# Patient Record
Sex: MALE | Race: WHITE | Employment: FULL TIME | URBAN - NONMETROPOLITAN AREA
[De-identification: names, ages, dates, MRNs, and addresses within clinical notes are randomized per-mention and may not be internally consistent; named-entity substitution may affect disease eponyms.]

---

## 2018-01-30 ENCOUNTER — OFFICE VISIT (OUTPATIENT)
Dept: URGENT CARE | Facility: PHYSICIAN GROUP | Age: 38
End: 2018-01-30
Payer: COMMERCIAL

## 2018-01-30 VITALS
HEART RATE: 102 BPM | TEMPERATURE: 98 F | RESPIRATION RATE: 16 BRPM | OXYGEN SATURATION: 99 % | BODY MASS INDEX: 28.14 KG/M2 | SYSTOLIC BLOOD PRESSURE: 162 MMHG | DIASTOLIC BLOOD PRESSURE: 102 MMHG | WEIGHT: 190 LBS | HEIGHT: 69 IN

## 2018-01-30 DIAGNOSIS — R06.02 SOB (SHORTNESS OF BREATH): ICD-10-CM

## 2018-01-30 DIAGNOSIS — R07.89 CHEST HEAVINESS: ICD-10-CM

## 2018-01-30 DIAGNOSIS — R20.0 NUMBNESS AND TINGLING OF UPPER AND LOWER EXTREMITIES OF BOTH SIDES: ICD-10-CM

## 2018-01-30 DIAGNOSIS — R61 DIAPHORESIS: ICD-10-CM

## 2018-01-30 DIAGNOSIS — R11.0 NAUSEA: ICD-10-CM

## 2018-01-30 DIAGNOSIS — Z56.6 STRESS AT WORK: ICD-10-CM

## 2018-01-30 DIAGNOSIS — R20.2 NUMBNESS AND TINGLING OF UPPER AND LOWER EXTREMITIES OF BOTH SIDES: ICD-10-CM

## 2018-01-30 LAB — GLUCOSE BLD-MCNC: 116 MG/DL (ref 70–100)

## 2018-01-30 PROCEDURE — 99214 OFFICE O/P EST MOD 30 MIN: CPT | Performed by: PHYSICIAN ASSISTANT

## 2018-01-30 PROCEDURE — 82962 GLUCOSE BLOOD TEST: CPT | Performed by: PHYSICIAN ASSISTANT

## 2018-01-30 RX ORDER — ASPIRIN 81 MG/1
324 TABLET, CHEWABLE ORAL ONCE
OUTPATIENT
Start: 2018-01-30 | End: 2018-01-31

## 2018-01-30 SDOH — HEALTH STABILITY - MENTAL HEALTH: OTHER PHYSICAL AND MENTAL STRAIN RELATED TO WORK: Z56.6

## 2018-01-31 NOTE — PROGRESS NOTES
"Chief Complaint   Patient presents with   • Numbness     extremities all of them driving home a couple min    • Shortness of Breath       HISTORY OF PRESENT ILLNESS: Patient is a 37 y.o. male who presents today for the following:    Driving home from work around 1700 today  Numbness of arms/legs/back of head suddenly  Pulled over, got a candy bar/gatorade: numbness resolved, still felt \"weird\"   Started driving again; 20 minutes after driving, numbness started again in the same locations  H/o the same but not numb everywhere at once; occurs while working or under stress  \"Pretty stressful day at work\" today  + SOB, heaviness in chest, cold/clammy, nauseated (in waiting room only)  Denies leg swelling  No known PMH (hasn't seen a doctor in 15 years)  Family hx: Dad, DM/MI,  at 50 ?DM; Mom, cancer; siblings, no MI/CVA/HTN  EtOH: 2 drinks/week  Tobacco: none  Illicit: denies any hx  CURRENTLY: heavy chest, SOB, clammy     Patient Active Problem List    Diagnosis Date Noted   • Strep pharyngitis 2016   • Pharyngitis 2016       Allergies:Patient has no known allergies.    No current [facility]-ordered outpatient prescriptions on file.     Current Facility-Administered Medications Ordered in Epic   Medication Dose Route Frequency Provider Last Rate Last Dose   • aspirin (ASA) chewable tab 324 mg  324 mg Oral Once Guera Caicedo P.A.-SHAGGY.           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 2 yo       Social History   Substance Use Topics   • Smoking status: Never Smoker   • Smokeless tobacco: Never Used   • Alcohol use Yes      Comment: occassional       Family Status   Relation Status   • Mother Alive   • Father    • Sister Alive   • Brother Alive   • Brother Alive   • Sister Alive     Family History   Problem Relation Age of Onset   • Cancer Mother    • Diabetes Father    • Alcohol/Drug Father    • Stroke Father    • Other Brother      liver   • " "Alcohol/Drug Brother    • Heart Disease Neg Hx        Review of Systems:   Constitutional ROS: No unexplained fevers, sweats, or chills  Eye ROS: No recent significant change in vision, No eye pain, redness, discharge  Ear ROS: No drainage, No tinnitus or vertigo, No recent change in hearing  Mouth/Throat ROS: No teeth or gum problems, No bleeding gums, No tongue complaints  Neck ROS: No swollen glands, No recent swelling in thyroid area, No significant pain in neck  Pulmonary ROS: Positive for dyspnea at rest   Cardiovascular ROS: Positive for chest heaviness, elevated blood pressure  Gastrointestinal ROS: Positive for nausea  Musculoskeletal/Extremities ROS: No clubbing, No cyanosis, No peripheral edema  Hematologic/Lymphatic ROS: No abnormal bleeding, No chills, No bruising  Skin/Integumentary ROS: No abnormal skin lesions noted, No edema, No evidence of rash  Neurologic ROS: No seizures, No weakness  Psychiatric ROS: No depression, No psychosis      Exam:  Blood pressure (!) 162/102, pulse (!) 102, temperature 36.7 °C (98 °F), resp. rate 16, height 1.753 m (5' 9\"), weight 86.2 kg (190 lb), SpO2 99 %.  General: Well developed, well nourished. No distress.  HEENT: Conjunctiva clear, lids without ptosis, PERRL/EOMI. Ears normal shape and contour, canals are clear bilaterally, tympanic membranes are benign. Nasal mucosa benign. Oropharynx is without erythema, edema or exudates. Moist mucous membranes.  Neck: Trachea midline, no masses. No thyromegaly. No carotid bruits noted.  Pulmonary: Clear to ausculation and percussion.  Normal effort. No rales, ronchi, or wheezing.   Cardiovascular: Regular rate and rhythm without murmur. No edema.  Radial and pedal pulses are strong and equal bilaterally.  Abdomen: Soft, non-tender, nondistended. No hepatosplenomegaly.   Neurologic: Grossly nonfocal.  Lymph: No cervical lymphadenopathy noted.  Skin: Warm, dry, good turgor. No rashes in visible areas.   Psych: Normal mood. " Alert and oriented x3. Judgment and insight is normal.    EKG, per my interpretation: Normal sinus rhythm, normal axis, no ST elevation/depression.    Glucose: 116    Aspirin 324 mg, chewable    1935, recheck:  Left upper extremity 152/95  Right upper extremity 149/98  Patient states this chest heaviness and shortness of breath have almost completely resolved    Assessment/Plan:  Discussed differential diagnosis with the patient including but not limited to stress, PE, and MI. Unable to rule out any cardiac etiology from this setting. Patient declined ER evaluation and would like to see how he feels at home. Discussed strict ER precautions for any worsening chest heaviness or shortness of breath.  1. Chest heaviness  EKG    aspirin (ASA) chewable tab 324 mg   2. SOB (shortness of breath)  EKG    aspirin (ASA) chewable tab 324 mg   3. Diaphoresis  POCT Glucose    aspirin (ASA) chewable tab 324 mg   4. Nausea  POCT Glucose    aspirin (ASA) chewable tab 324 mg   5. Numbness and tingling of upper and lower extremities of both sides     6. Stress at work

## 2019-04-08 ENCOUNTER — HOSPITAL ENCOUNTER (EMERGENCY)
Dept: HOSPITAL 8 - ED | Age: 39
Discharge: HOME | End: 2019-04-08
Payer: COMMERCIAL

## 2019-04-08 VITALS — SYSTOLIC BLOOD PRESSURE: 148 MMHG | DIASTOLIC BLOOD PRESSURE: 81 MMHG

## 2019-04-08 VITALS — WEIGHT: 203.93 LBS | BODY MASS INDEX: 30.91 KG/M2 | HEIGHT: 68 IN

## 2019-04-08 DIAGNOSIS — G44.209: ICD-10-CM

## 2019-04-08 DIAGNOSIS — I10: ICD-10-CM

## 2019-04-08 DIAGNOSIS — R07.89: Primary | ICD-10-CM

## 2019-04-08 LAB
ALBUMIN SERPL-MCNC: 4.2 G/DL (ref 3.4–5)
ALP SERPL-CCNC: 53 U/L (ref 45–117)
ALT SERPL-CCNC: 47 U/L (ref 12–78)
ANION GAP SERPL CALC-SCNC: 6 MMOL/L (ref 5–15)
BASOPHILS # BLD AUTO: 0.02 X10^3/UL (ref 0–0.1)
BASOPHILS NFR BLD AUTO: 0 % (ref 0–1)
BILIRUB SERPL-MCNC: 0.4 MG/DL (ref 0.2–1)
CALCIUM SERPL-MCNC: 8.9 MG/DL (ref 8.5–10.1)
CHLORIDE SERPL-SCNC: 109 MMOL/L (ref 98–107)
CREAT SERPL-MCNC: 0.99 MG/DL (ref 0.7–1.3)
EOSINOPHIL # BLD AUTO: 0.08 X10^3/UL (ref 0–0.4)
EOSINOPHIL NFR BLD AUTO: 1 % (ref 1–7)
ERYTHROCYTE [DISTWIDTH] IN BLOOD BY AUTOMATED COUNT: 13.3 % (ref 9.4–14.8)
LYMPHOCYTES # BLD AUTO: 2.5 X10^3/UL (ref 1–3.4)
LYMPHOCYTES NFR BLD AUTO: 34 % (ref 22–44)
MCH RBC QN AUTO: 29.4 PG (ref 27.5–34.5)
MCHC RBC AUTO-ENTMCNC: 34.3 G/DL (ref 33.2–36.2)
MCV RBC AUTO: 85.8 FL (ref 81–97)
MD: NO
MONOCYTES # BLD AUTO: 0.5 X10^3/UL (ref 0.2–0.8)
MONOCYTES NFR BLD AUTO: 7 % (ref 2–9)
NEUTROPHILS # BLD AUTO: 4.18 X10^3/UL (ref 1.8–6.8)
NEUTROPHILS NFR BLD AUTO: 57 % (ref 42–75)
PLATELET # BLD AUTO: 262 X10^3/UL (ref 130–400)
PMV BLD AUTO: 7.6 FL (ref 7.4–10.4)
PROT SERPL-MCNC: 7.8 G/DL (ref 6.4–8.2)
RBC # BLD AUTO: 5.15 X10^6/UL (ref 4.38–5.82)
TROPONIN I SERPL-MCNC: < 0.015 NG/ML (ref 0–0.04)

## 2019-04-08 PROCEDURE — 71045 X-RAY EXAM CHEST 1 VIEW: CPT

## 2019-04-08 PROCEDURE — 84484 ASSAY OF TROPONIN QUANT: CPT

## 2019-04-08 PROCEDURE — 93005 ELECTROCARDIOGRAM TRACING: CPT

## 2019-04-08 PROCEDURE — 80053 COMPREHEN METABOLIC PANEL: CPT

## 2019-04-08 PROCEDURE — 99284 EMERGENCY DEPT VISIT MOD MDM: CPT

## 2019-04-08 PROCEDURE — 85025 COMPLETE CBC W/AUTO DIFF WBC: CPT

## 2019-04-08 PROCEDURE — 36415 COLL VENOUS BLD VENIPUNCTURE: CPT

## 2019-04-08 NOTE — NUR
pt to ed for sob, chest pressure and general fatigue and weakness since this 
am. pt states pressure is worse after eating and moves to epigastric area. pt 
received 162 asa in triage. connected to monitors. vss. awaiting edmd 
assessment.

## 2020-02-29 ENCOUNTER — OFFICE VISIT (OUTPATIENT)
Dept: URGENT CARE | Facility: PHYSICIAN GROUP | Age: 40
End: 2020-02-29
Payer: COMMERCIAL

## 2020-02-29 VITALS
SYSTOLIC BLOOD PRESSURE: 128 MMHG | WEIGHT: 201 LBS | OXYGEN SATURATION: 95 % | DIASTOLIC BLOOD PRESSURE: 82 MMHG | HEART RATE: 80 BPM | RESPIRATION RATE: 16 BRPM | BODY MASS INDEX: 29.68 KG/M2 | TEMPERATURE: 97.8 F

## 2020-02-29 DIAGNOSIS — K13.79 SORE IN MOUTH: ICD-10-CM

## 2020-02-29 DIAGNOSIS — J03.90 TONSILLITIS: ICD-10-CM

## 2020-02-29 DIAGNOSIS — J02.9 PHARYNGITIS, UNSPECIFIED ETIOLOGY: ICD-10-CM

## 2020-02-29 PROCEDURE — 99214 OFFICE O/P EST MOD 30 MIN: CPT | Performed by: NURSE PRACTITIONER

## 2020-02-29 RX ORDER — FLUOXETINE 10 MG/1
10 CAPSULE ORAL DAILY
COMMUNITY
End: 2023-08-04

## 2020-02-29 RX ORDER — AMOXICILLIN 500 MG/1
500 CAPSULE ORAL 2 TIMES DAILY
Qty: 20 CAP | Refills: 0 | Status: SHIPPED | OUTPATIENT
Start: 2020-02-29 | End: 2020-03-10

## 2020-02-29 RX ORDER — CHLORHEXIDINE GLUCONATE ORAL RINSE 1.2 MG/ML
15 SOLUTION DENTAL 2 TIMES DAILY
Qty: 1 BOTTLE | Refills: 0 | Status: SHIPPED | OUTPATIENT
Start: 2020-02-29 | End: 2023-08-04

## 2020-02-29 ASSESSMENT — ENCOUNTER SYMPTOMS
CHILLS: 0
MYALGIAS: 0
DIZZINESS: 0
COUGH: 0
HOARSE VOICE: 0
NAUSEA: 0
NECK PAIN: 0
HEADACHES: 0
SORE THROAT: 1
VOMITING: 0
TROUBLE SWALLOWING: 1
SHORTNESS OF BREATH: 0
EYE PAIN: 0
SWOLLEN GLANDS: 1
FEVER: 1

## 2020-02-29 NOTE — PROGRESS NOTES
Subjective:   Gianni Albert  is a 39 y.o. male who presents for Sore Throat (x1wk) and Otalgia (L ear x1wk)        Pharyngitis    This is a new problem. The current episode started in the past 7 days. The problem has been gradually worsening. The pain is worse on the left side. There has been no fever. The pain is at a severity of 10/10. The pain is severe. Associated symptoms include ear pain, swollen glands and trouble swallowing. Pertinent negatives include no congestion, coughing, ear discharge, headaches, hoarse voice, neck pain, shortness of breath or vomiting. Associated symptoms comments: Sore on tonsil  . He has had no exposure to strep. He has tried acetaminophen for the symptoms. The treatment provided no relief.     Review of Systems   Constitutional: Positive for fever. Negative for chills.   HENT: Positive for ear pain, sore throat and trouble swallowing. Negative for congestion, ear discharge and hoarse voice.    Eyes: Negative for pain.   Respiratory: Negative for cough and shortness of breath.    Cardiovascular: Negative for chest pain.   Gastrointestinal: Negative for nausea and vomiting.   Genitourinary: Negative for hematuria.   Musculoskeletal: Negative for myalgias and neck pain.   Skin: Negative for rash.   Neurological: Negative for dizziness and headaches.     No Known Allergies   Objective:   /82   Pulse 80   Temp 36.6 °C (97.8 °F) (Temporal)   Resp 16   Wt 91.2 kg (201 lb)   SpO2 95%   BMI 29.68 kg/m²   Physical Exam  Vitals signs and nursing note reviewed.   Constitutional:       General: He is not in acute distress.     Appearance: He is well-developed.   HENT:      Head: Normocephalic and atraumatic.      Right Ear: Tympanic membrane and external ear normal.      Left Ear: Tympanic membrane and external ear normal.      Nose: Nose normal.      Right Sinus: No maxillary sinus tenderness or frontal sinus tenderness.      Left Sinus: No maxillary sinus tenderness or frontal  sinus tenderness.      Mouth/Throat:      Mouth: Mucous membranes are moist.      Pharynx: Uvula midline. Posterior oropharyngeal erythema present.      Tonsils: No tonsillar exudate or tonsillar abscesses. 2+ on the right. 2+ on the left.     Eyes:      General:         Right eye: No discharge.         Left eye: No discharge.      Conjunctiva/sclera: Conjunctivae normal.      Pupils: Pupils are equal, round, and reactive to light.   Cardiovascular:      Rate and Rhythm: Normal rate and regular rhythm.      Heart sounds: No murmur.   Pulmonary:      Effort: Pulmonary effort is normal. No respiratory distress.      Breath sounds: Normal breath sounds.   Abdominal:      General: There is no distension.      Palpations: Abdomen is soft.      Tenderness: There is no abdominal tenderness.   Musculoskeletal: Normal range of motion.   Skin:     General: Skin is warm and dry.   Neurological:      General: No focal deficit present.      Mental Status: He is alert and oriented to person, place, and time. Mental status is at baseline.      Gait: Gait (gait at baseline) normal.   Psychiatric:         Judgment: Judgment normal.           Assessment/Plan:     1. Pharyngitis, unspecified etiology  chlorhexidine (PERIDEX) 0.12 % Solution    amoxicillin (AMOXIL) 500 MG Cap    REFERRAL TO ENT   2. Tonsillitis  chlorhexidine (PERIDEX) 0.12 % Solution    amoxicillin (AMOXIL) 500 MG Cap    REFERRAL TO ENT   3. Sore in mouth       Advised to continue supportive care with Tylenol and/or ibuprofen for fevers and discomfort. Increased fluids and electrolytes.  Patient requesting to referral to ENT for further evaluation and management, referral is been placed.  Patient and/or guardian given precautionary s/sx that mandate immediate follow up and evaluation in the ED. Advised of risks of not doing so.  Side effects of the above medications discussed.   DDX, Supportive care, and indications for immediate follow-up discussed with patient.       Instructed to return to clinic or nearest emergency department if we are not available for any change in condition, further concerns, or worsening of symptoms.    The patient and/or guardian demonstrated a good understanding and agreed with the treatment plan.    Please note that this dictation was created using voice recognition software. I have made every reasonable attempt to correct obvious errors, but I expect that there are errors of grammar and possibly content that I did not discover before finalizing the note.

## 2023-08-04 ENCOUNTER — OFFICE VISIT (OUTPATIENT)
Dept: URGENT CARE | Facility: PHYSICIAN GROUP | Age: 43
End: 2023-08-04
Payer: COMMERCIAL

## 2023-08-04 VITALS
BODY MASS INDEX: 29.4 KG/M2 | HEART RATE: 86 BPM | RESPIRATION RATE: 16 BRPM | TEMPERATURE: 98.4 F | SYSTOLIC BLOOD PRESSURE: 118 MMHG | DIASTOLIC BLOOD PRESSURE: 88 MMHG | OXYGEN SATURATION: 98 % | WEIGHT: 194 LBS | HEIGHT: 68 IN

## 2023-08-04 DIAGNOSIS — H66.002 ACUTE SUPPURATIVE OTITIS MEDIA OF LEFT EAR WITHOUT SPONTANEOUS RUPTURE OF TYMPANIC MEMBRANE, RECURRENCE NOT SPECIFIED: ICD-10-CM

## 2023-08-04 PROCEDURE — 99213 OFFICE O/P EST LOW 20 MIN: CPT | Performed by: NURSE PRACTITIONER

## 2023-08-04 PROCEDURE — 3079F DIAST BP 80-89 MM HG: CPT | Performed by: NURSE PRACTITIONER

## 2023-08-04 PROCEDURE — 3074F SYST BP LT 130 MM HG: CPT | Performed by: NURSE PRACTITIONER

## 2023-08-04 RX ORDER — AMOXICILLIN AND CLAVULANATE POTASSIUM 875; 125 MG/1; MG/1
1 TABLET, FILM COATED ORAL 2 TIMES DAILY
Qty: 20 TABLET | Refills: 0 | Status: SHIPPED | OUTPATIENT
Start: 2023-08-04 | End: 2023-08-14

## 2023-08-04 RX ORDER — LOSARTAN POTASSIUM 25 MG/1
25 TABLET ORAL DAILY
COMMUNITY

## 2023-08-05 NOTE — PROGRESS NOTES
"Gianni Albert is a 42 y.o. male who presents for Ear Fullness (X 4 days on L side.  He said he is having a \"thudding\" in the L ear. )      HPI  This is a new problem. Gianni Albert is a 42 y.o. patient who presents to urgent care with c/o: Left ear pain with a throbbing sensation in his left ear.  He was able to remove some earwax was in his ear with some hydroperoxide and water with a Q-tip.  That did not relieve his pain.  His pain is radiating down into the side of his neck.  He denies fever, chills, cough, sore throat, sinus pain or ear drainage.  No other aggravating leaving factors.        ROS See HPI    Allergies:     No Known Allergies    PMSFS Hx:  Past Medical History:   Diagnosis Date    Seizure (HCC)     Grand mal, last episode at 4 yo     Past Surgical History:   Procedure Laterality Date    HERNIA REPAIR      2, inguinal     Family History   Problem Relation Age of Onset    Cancer Mother     Diabetes Father     Alcohol/Drug Father     Stroke Father     Other Brother         liver    Alcohol/Drug Brother     Heart Disease Neg Hx      Social History     Tobacco Use    Smoking status: Never    Smokeless tobacco: Never   Substance Use Topics    Alcohol use: Yes     Comment: occassional       Problems:   Patient Active Problem List   Diagnosis    Strep pharyngitis    Pharyngitis       Medications:   Current Outpatient Medications on File Prior to Visit   Medication Sig Dispense Refill    losartan (COZAAR) 25 MG Tab Take 25 mg by mouth every day.       No current facility-administered medications on file prior to visit.          Objective:     /88   Pulse 86   Temp 36.9 °C (98.4 °F) (Temporal)   Resp 16   Ht 1.727 m (5' 8\")   Wt 88 kg (194 lb)   SpO2 98%   BMI 29.50 kg/m²     Physical Exam  Vitals and nursing note reviewed.   Constitutional:       General: He is not in acute distress.     Appearance: He is well-developed.   HENT:      Head: Normocephalic.      Right Ear: Hearing, ear " canal and external ear normal.      Left Ear: Hearing, ear canal and external ear normal. Tenderness present. A middle ear effusion is present. Tympanic membrane is erythematous and bulging.      Mouth/Throat:      Lips: Pink.      Mouth: Mucous membranes are moist.   Eyes:      Pupils: Pupils are equal, round, and reactive to light.   Cardiovascular:      Rate and Rhythm: Normal rate and regular rhythm.      Pulses: Normal pulses.      Heart sounds: Normal heart sounds.   Pulmonary:      Effort: Pulmonary effort is normal.   Musculoskeletal:      Cervical back: Normal range of motion and neck supple.   Lymphadenopathy:      Head:      Right side of head: No tonsillar adenopathy.      Left side of head: No tonsillar adenopathy.      Cervical: No cervical adenopathy.   Skin:     General: Skin is warm and dry.      Capillary Refill: Capillary refill takes less than 2 seconds.   Neurological:      Mental Status: He is alert and oriented to person, place, and time.   Psychiatric:         Mood and Affect: Mood normal.         Speech: Speech normal.         Behavior: Behavior normal. Behavior is cooperative.         Thought Content: Thought content normal.           Assessment /Associated Orders:      1. Acute suppurative otitis media of left ear without spontaneous rupture of tympanic membrane, recurrence not specified  amoxicillin-clavulanate (AUGMENTIN) 875-125 MG Tab          Medical Decision Making:    Pt is clinically stable at today's acute urgent care visit.  No acute distress noted. Appropriate for outpatient care at this time.   Acute problem today with uncertain prognosis.     This is a very pleasant 42-year-old male with complaints of severe throbbing ear pain.  His clinical exam shows a suppurative acute otitis media.    Educated in proper administration of  prescription medication(s) ordered today including safety, possible SE, risks, benefits, rationale and alternatives to therapy.   OTC  analgesic of  choice (acetaminophen or NSAID) prn pain. Follow manufactures dosing and safety precautions.   Warm compress prn pain   Discussed Dx, management options (risks,benefits, and alternatives to planned treatment), natural progression and supportive care.  Expressed understanding and the treatment plan was agreed upon.   Questions were encouraged and answered   Return to  PCP/ urgent care prn if new or worsening sx or if there is no improvement in condition prn.              Please note that this dictation was created using voice recognition software. I have worked with consultants from the vendor as well as technical experts from [employer] [employer] to optimize the interface. I have made every reasonable attempt to correct obvious errors, but I expect that there are errors of grammar and possibly content that I did not discover before finalizing the note.  This note was electronically signed by provider

## 2024-06-04 ENCOUNTER — OFFICE VISIT (OUTPATIENT)
Dept: URGENT CARE | Facility: PHYSICIAN GROUP | Age: 44
End: 2024-06-04
Payer: COMMERCIAL

## 2024-06-04 VITALS
SYSTOLIC BLOOD PRESSURE: 122 MMHG | DIASTOLIC BLOOD PRESSURE: 84 MMHG | OXYGEN SATURATION: 95 % | WEIGHT: 195 LBS | TEMPERATURE: 98 F | HEART RATE: 96 BPM | RESPIRATION RATE: 16 BRPM | HEIGHT: 68 IN | BODY MASS INDEX: 29.55 KG/M2

## 2024-06-04 DIAGNOSIS — R10.84 GENERALIZED ABDOMINAL PAIN: ICD-10-CM

## 2024-06-04 DIAGNOSIS — H66.003 ACUTE SUPPURATIVE OTITIS MEDIA OF BOTH EARS WITHOUT SPONTANEOUS RUPTURE OF TYMPANIC MEMBRANES, RECURRENCE NOT SPECIFIED: ICD-10-CM

## 2024-06-04 DIAGNOSIS — J02.9 SORE THROAT: ICD-10-CM

## 2024-06-04 DIAGNOSIS — R11.2 NAUSEA AND VOMITING, UNSPECIFIED VOMITING TYPE: ICD-10-CM

## 2024-06-04 LAB
APPEARANCE UR: CLEAR
BILIRUB UR STRIP-MCNC: NORMAL MG/DL
COLOR UR AUTO: YELLOW
GLUCOSE UR STRIP.AUTO-MCNC: NORMAL MG/DL
KETONES UR STRIP.AUTO-MCNC: NORMAL MG/DL
LEUKOCYTE ESTERASE UR QL STRIP.AUTO: NORMAL
NITRITE UR QL STRIP.AUTO: NORMAL
PH UR STRIP.AUTO: 6 [PH] (ref 5–8)
PROT UR QL STRIP: NORMAL MG/DL
RBC UR QL AUTO: NORMAL
S PYO DNA SPEC NAA+PROBE: NOT DETECTED
SP GR UR STRIP.AUTO: 1.01
UROBILINOGEN UR STRIP-MCNC: 0.2 MG/DL

## 2024-06-04 PROCEDURE — 87651 STREP A DNA AMP PROBE: CPT | Performed by: STUDENT IN AN ORGANIZED HEALTH CARE EDUCATION/TRAINING PROGRAM

## 2024-06-04 PROCEDURE — 81002 URINALYSIS NONAUTO W/O SCOPE: CPT | Performed by: STUDENT IN AN ORGANIZED HEALTH CARE EDUCATION/TRAINING PROGRAM

## 2024-06-04 PROCEDURE — 3074F SYST BP LT 130 MM HG: CPT | Performed by: STUDENT IN AN ORGANIZED HEALTH CARE EDUCATION/TRAINING PROGRAM

## 2024-06-04 PROCEDURE — 3079F DIAST BP 80-89 MM HG: CPT | Performed by: STUDENT IN AN ORGANIZED HEALTH CARE EDUCATION/TRAINING PROGRAM

## 2024-06-04 PROCEDURE — 99214 OFFICE O/P EST MOD 30 MIN: CPT | Performed by: STUDENT IN AN ORGANIZED HEALTH CARE EDUCATION/TRAINING PROGRAM

## 2024-06-04 RX ORDER — LOSARTAN POTASSIUM AND HYDROCHLOROTHIAZIDE 12.5; 5 MG/1; MG/1
1 TABLET ORAL DAILY
COMMUNITY
Start: 2024-04-10

## 2024-06-04 RX ORDER — OMEPRAZOLE 20 MG/1
20 CAPSULE, DELAYED RELEASE ORAL DAILY
COMMUNITY

## 2024-06-04 RX ORDER — ONDANSETRON 4 MG/1
4 TABLET, ORALLY DISINTEGRATING ORAL EVERY 6 HOURS PRN
Qty: 15 TABLET | Refills: 0 | Status: SHIPPED | OUTPATIENT
Start: 2024-06-04

## 2024-06-04 RX ORDER — AMOXICILLIN AND CLAVULANATE POTASSIUM 875; 125 MG/1; MG/1
1 TABLET, FILM COATED ORAL 2 TIMES DAILY
Qty: 14 TABLET | Refills: 0 | Status: SHIPPED | OUTPATIENT
Start: 2024-06-04 | End: 2024-06-11

## 2024-06-04 RX ORDER — ONDANSETRON 4 MG/1
TABLET, ORALLY DISINTEGRATING ORAL
COMMUNITY

## 2024-06-04 ASSESSMENT — ENCOUNTER SYMPTOMS
VOMITING: 1
DIARRHEA: 0
NAUSEA: 1
CHILLS: 0
ABDOMINAL PAIN: 1
CONSTIPATION: 0
FEVER: 0
SORE THROAT: 1

## 2024-06-04 NOTE — PROGRESS NOTES
"Subjective     Gianni Albert is a 43 y.o. male who presents with Sore Throat, Nausea, and Otalgia            Presley is a 43 y.o. male who presents to urgent care with abdominal pain and nausea/vomiting.  Patient also reports symptoms of fatigue, congestion, ear pain and sore throat.  Patient states he has had nausea and vomiting for approximately 2 weeks on and off.  No fever/chills.  No urinary symptoms.  Patient having normal BMs. Tolerating p.o. food/fluids but does report decreased appetite. No weight loss.        Review of Systems   Constitutional:  Positive for malaise/fatigue. Negative for chills and fever.   HENT:  Positive for congestion, ear pain and sore throat.    Respiratory:  Negative for cough and shortness of breath.    Cardiovascular:  Negative for chest pain and palpitations.   Gastrointestinal:  Positive for abdominal pain, nausea and vomiting. Negative for blood in stool, constipation, diarrhea and melena.   All other systems reviewed and are negative.             Objective     /84   Pulse 96   Temp 36.7 °C (98 °F) (Temporal)   Resp 16   Ht 1.727 m (5' 8\")   Wt 88.5 kg (195 lb)   SpO2 95%   BMI 29.65 kg/m²      Physical Exam  Vitals reviewed.   Constitutional:       General: He is not in acute distress.     Appearance: Normal appearance. He is not ill-appearing, toxic-appearing or diaphoretic.   HENT:      Head: Normocephalic and atraumatic.      Right Ear: Ear canal and external ear normal. Tympanic membrane is erythematous and bulging.      Left Ear: Ear canal and external ear normal. Tympanic membrane is erythematous and bulging.      Nose: Congestion present.      Mouth/Throat:      Mouth: Mucous membranes are moist.      Pharynx: Oropharynx is clear. Posterior oropharyngeal erythema present. No oropharyngeal exudate.   Eyes:      Conjunctiva/sclera: Conjunctivae normal.      Pupils: Pupils are equal, round, and reactive to light.   Cardiovascular:      Rate and Rhythm: " Normal rate and regular rhythm.   Pulmonary:      Effort: Pulmonary effort is normal.      Breath sounds: Normal breath sounds.   Abdominal:      General: Abdomen is flat. Bowel sounds are normal. There is no distension.      Palpations: Abdomen is soft.      Tenderness: There is generalized abdominal tenderness. There is no guarding or rebound.   Skin:     General: Skin is warm and dry.   Neurological:      General: No focal deficit present.      Mental Status: He is alert.                             Assessment & Plan          1. Nausea and vomiting, unspecified vomiting type  - ondansetron (ZOFRAN ODT) 4 MG TABLET DISPERSIBLE; Take 1 Tablet by mouth every 6 hours as needed for Nausea/Vomiting for up to 15 doses.  Dispense: 15 Tablet; Refill: 0    2. Sore throat  - POCT CEPHEID GROUP A STREP - PCR: Negative.    3. Acute suppurative otitis media of both ears without spontaneous rupture of tympanic membranes, recurrence not specified  - amoxicillin-clavulanate (AUGMENTIN) 875-125 MG Tab; Take 1 Tablet by mouth 2 times a day for 7 days.  Dispense: 14 Tablet; Refill: 0    4. Generalized abdominal pain  - Generalized abdominal tenderness. No rebound or guarding. Non-acute abdomen of physical exam.  - POCT Urinalysis within normal limits.    Differential diagnoses, supportive care measures and indications for immediate follow-up discussed with patient. Pathogenesis of diagnosis discussed including typical length and natural progression.  ER precautions discussed. Follow up with PCP.    Instructed to return to urgent care or nearest emergency department if symptoms fail to improve, for any change in condition, further concerns, or new concerning symptoms.    Patient states understanding and agrees with the plan of care and discharge instructions.            My total time spent caring for the patient on the day of the encounter was 31 minutes including obtaining patient hx, physical exam, discussing test results,  discussing ddx, plan of care, supportive care measures, appropriate follow up and indications for immediate follow up. This does not include time spent on separately billable procedures/tests.

## 2024-06-05 ASSESSMENT — ENCOUNTER SYMPTOMS
PALPITATIONS: 0
SHORTNESS OF BREATH: 0
BLOOD IN STOOL: 0
COUGH: 0

## 2024-08-30 ENCOUNTER — HOSPITAL ENCOUNTER (OUTPATIENT)
Dept: LAB | Facility: MEDICAL CENTER | Age: 44
End: 2024-08-30
Attending: FAMILY MEDICINE
Payer: COMMERCIAL

## 2024-08-30 LAB — URATE SERPL-MCNC: 6.6 MG/DL (ref 2.5–8.3)

## 2024-08-30 PROCEDURE — 84550 ASSAY OF BLOOD/URIC ACID: CPT

## 2024-08-30 PROCEDURE — 36415 COLL VENOUS BLD VENIPUNCTURE: CPT

## 2025-08-29 ENCOUNTER — HOSPITAL ENCOUNTER (OUTPATIENT)
Dept: LAB | Facility: MEDICAL CENTER | Age: 45
End: 2025-08-29
Attending: FAMILY MEDICINE
Payer: COMMERCIAL

## 2025-08-29 LAB
ALBUMIN SERPL BCP-MCNC: 4.3 G/DL (ref 3.2–4.9)
ALBUMIN/GLOB SERPL: 1.5 G/DL
ALP SERPL-CCNC: 55 U/L (ref 30–99)
ALT SERPL-CCNC: 35 U/L (ref 2–50)
ANION GAP SERPL CALC-SCNC: 5 MMOL/L (ref 7–16)
AST SERPL-CCNC: 22 U/L (ref 12–45)
BASOPHILS # BLD AUTO: 0.4 % (ref 0–1.8)
BASOPHILS # BLD: 0.03 K/UL (ref 0–0.12)
BILIRUB SERPL-MCNC: 0.3 MG/DL (ref 0.1–1.5)
BUN SERPL-MCNC: 11 MG/DL (ref 8–22)
CALCIUM ALBUM COR SERPL-MCNC: 8.9 MG/DL (ref 8.5–10.5)
CALCIUM SERPL-MCNC: 9.1 MG/DL (ref 8.5–10.5)
CHLORIDE SERPL-SCNC: 103 MMOL/L (ref 96–112)
CHOLEST SERPL-MCNC: 197 MG/DL (ref 100–199)
CO2 SERPL-SCNC: 28 MMOL/L (ref 20–33)
CREAT SERPL-MCNC: 0.95 MG/DL (ref 0.5–1.4)
EOSINOPHIL # BLD AUTO: 0.1 K/UL (ref 0–0.51)
EOSINOPHIL NFR BLD: 1.5 % (ref 0–6.9)
ERYTHROCYTE [DISTWIDTH] IN BLOOD BY AUTOMATED COUNT: 38.7 FL (ref 35.9–50)
FASTING STATUS PATIENT QL REPORTED: NORMAL
GFR SERPLBLD CREATININE-BSD FMLA CKD-EPI: 101 ML/MIN/1.73 M 2
GLOBULIN SER CALC-MCNC: 2.9 G/DL (ref 1.9–3.5)
GLUCOSE SERPL-MCNC: 118 MG/DL (ref 65–99)
HCT VFR BLD AUTO: 42.9 % (ref 42–52)
HDLC SERPL-MCNC: 30 MG/DL
HGB BLD-MCNC: 14.8 G/DL (ref 14–18)
IMM GRANULOCYTES # BLD AUTO: 0.03 K/UL (ref 0–0.11)
IMM GRANULOCYTES NFR BLD AUTO: 0.4 % (ref 0–0.9)
LDLC SERPL CALC-MCNC: 107 MG/DL
LYMPHOCYTES # BLD AUTO: 2.67 K/UL (ref 1–4.8)
LYMPHOCYTES NFR BLD: 39.9 % (ref 22–41)
MCH RBC QN AUTO: 28.7 PG (ref 27–33)
MCHC RBC AUTO-ENTMCNC: 34.5 G/DL (ref 32.3–36.5)
MCV RBC AUTO: 83.3 FL (ref 81.4–97.8)
MONOCYTES # BLD AUTO: 0.45 K/UL (ref 0–0.85)
MONOCYTES NFR BLD AUTO: 6.7 % (ref 0–13.4)
NEUTROPHILS # BLD AUTO: 3.42 K/UL (ref 1.82–7.42)
NEUTROPHILS NFR BLD: 51.1 % (ref 44–72)
NRBC # BLD AUTO: 0 K/UL
NRBC BLD-RTO: 0 /100 WBC (ref 0–0.2)
PLATELET # BLD AUTO: 254 K/UL (ref 164–446)
PMV BLD AUTO: 9.5 FL (ref 9–12.9)
POTASSIUM SERPL-SCNC: 3.5 MMOL/L (ref 3.6–5.5)
PROT SERPL-MCNC: 7.2 G/DL (ref 6–8.2)
RBC # BLD AUTO: 5.15 M/UL (ref 4.7–6.1)
SODIUM SERPL-SCNC: 136 MMOL/L (ref 135–145)
TRIGL SERPL-MCNC: 299 MG/DL (ref 0–149)
TSH SERPL DL<=0.005 MIU/L-ACNC: 1.62 UIU/ML (ref 0.38–5.33)
URATE SERPL-MCNC: 6.2 MG/DL (ref 2.5–8.3)
WBC # BLD AUTO: 6.7 K/UL (ref 4.8–10.8)

## 2025-08-29 PROCEDURE — 84443 ASSAY THYROID STIM HORMONE: CPT

## 2025-08-29 PROCEDURE — 84550 ASSAY OF BLOOD/URIC ACID: CPT

## 2025-08-29 PROCEDURE — 80061 LIPID PANEL: CPT

## 2025-08-29 PROCEDURE — 80053 COMPREHEN METABOLIC PANEL: CPT

## 2025-08-29 PROCEDURE — 85025 COMPLETE CBC W/AUTO DIFF WBC: CPT

## 2025-08-29 PROCEDURE — 36415 COLL VENOUS BLD VENIPUNCTURE: CPT
